# Patient Record
Sex: MALE | Race: WHITE | NOT HISPANIC OR LATINO | Employment: FULL TIME | URBAN - NONMETROPOLITAN AREA
[De-identification: names, ages, dates, MRNs, and addresses within clinical notes are randomized per-mention and may not be internally consistent; named-entity substitution may affect disease eponyms.]

---

## 2018-11-01 ENCOUNTER — HOSPITAL ENCOUNTER (OUTPATIENT)
Dept: GENERAL RADIOLOGY | Facility: HOSPITAL | Age: 28
Discharge: HOME | End: 2018-11-01
Admitting: NURSE PRACTITIONER

## 2018-11-01 ENCOUNTER — TRANSCRIBE ORDERS (OUTPATIENT)
Dept: LAB | Facility: HOSPITAL | Age: 28
End: 2018-11-01

## 2018-11-01 DIAGNOSIS — S62.91XA UNSPECIFIED FRACTURE OF RIGHT WRIST AND HAND, INITIAL ENCOUNTER FOR CLOSED FRACTURE: ICD-10-CM

## 2018-11-01 DIAGNOSIS — S62.91XA UNSPECIFIED FRACTURE OF RIGHT WRIST AND HAND, INITIAL ENCOUNTER FOR CLOSED FRACTURE: Primary | ICD-10-CM

## 2018-11-01 PROCEDURE — 73130 X-RAY EXAM OF HAND: CPT | Performed by: RADIOLOGY

## 2018-11-01 PROCEDURE — 73130 X-RAY EXAM OF HAND: CPT

## 2018-11-15 ENCOUNTER — OFFICE VISIT (OUTPATIENT)
Dept: UROLOGY | Facility: CLINIC | Age: 28
End: 2018-11-15

## 2018-11-15 VITALS — HEIGHT: 70 IN | BODY MASS INDEX: 32.21 KG/M2 | WEIGHT: 225 LBS

## 2018-11-15 DIAGNOSIS — Z30.09 ENCOUNTER FOR VASECTOMY COUNSELING: Primary | ICD-10-CM

## 2018-11-15 PROCEDURE — 99202 OFFICE O/P NEW SF 15 MIN: CPT | Performed by: UROLOGY

## 2018-11-15 RX ORDER — ALPRAZOLAM 1 MG/1
TABLET ORAL
Qty: 1 TABLET | Refills: 0 | Status: SHIPPED | OUTPATIENT
Start: 2018-11-15

## 2018-11-15 RX ORDER — BUPROPION HYDROCHLORIDE 300 MG/1
300 TABLET ORAL DAILY
COMMUNITY

## 2018-11-15 NOTE — PROGRESS NOTES
Chief Complaint:          Vasectomy consult    HPI:   28 y.o. male.    HPI Patient presents to the clinic today requesting a vasectomy for permanent sterilization.  He has 2 children and 1 stepchild.       Past Medical History:      No past medical history on file.      Current Meds:     Current Outpatient Medications   Medication Sig Dispense Refill   • buPROPion XL (WELLBUTRIN XL) 300 MG 24 hr tablet Take 300 mg by mouth Daily.       No current facility-administered medications for this visit.         Allergies:      No Known Allergies     Past Surgical History:     No past surgical history on file.      Social History:     Social History     Socioeconomic History   • Marital status:      Spouse name: Not on file   • Number of children: Not on file   • Years of education: Not on file   • Highest education level: Not on file   Social Needs   • Financial resource strain: Not on file   • Food insecurity - worry: Not on file   • Food insecurity - inability: Not on file   • Transportation needs - medical: Not on file   • Transportation needs - non-medical: Not on file   Occupational History   • Not on file   Tobacco Use   • Smoking status: Never Smoker   • Smokeless tobacco: Never Used   Substance and Sexual Activity   • Alcohol use: No     Frequency: Never   • Drug use: No   • Sexual activity: Yes   Other Topics Concern   • Not on file   Social History Narrative   • Not on file       Family History:     No family history on file.    Review of Systems:     Review of Systems   Genitourinary: Negative for decreased urine volume, difficulty urinating, discharge, dysuria, enuresis, flank pain, frequency, genital sores, hematuria, penile pain, penile swelling, scrotal swelling, testicular pain and urgency.       IPSS Questionnaire (AUA-7):  Over the past month…    1)  Incomplete Emptying  How often have you had a sensation of not emptying your bladder?  0 - Not at all   2)  Frequency  How often have you had to  urinate less than every two hours? 0 - Not at all   3)  Intermittency  How often have you found you stopped and started again several times when you urinated?  0 - Not at all   4) Urgency  How often have you found it difficult to postpone urination?  0 - Not at all   5) Weak Stream  How often have you had a weak urinary stream?  0 - Not at all   6) Straining  How often have you had to push or strain to begin urination?  0 - Not at all   7) Nocturia  How many times did you typically get up at night to urinate?  0 - None   Total Score:  0       Quality of life due to urinary symptoms:  If you were to spend the rest of your life with your urinary condition the way it is now, how would you feel about that? 0-Delighted   Urine Leakage (Incontinence) 0-No Leakage       I have reviewed the follow portions of the patient's history and confirmed they are accurate today:  allergies, current medications, past family history, past medical history, past social history, past surgical history, problem list and ROS  Physical Exam:     Physical Exam   Constitutional: He is oriented to person, place, and time.   HENT:   Head: Normocephalic and atraumatic.   Right Ear: External ear normal.   Left Ear: External ear normal.   Nose: Nose normal.   Mouth/Throat: Oropharynx is clear and moist.   Eyes: Conjunctivae and EOM are normal. Pupils are equal, round, and reactive to light.   Neck: Normal range of motion. Neck supple. No thyromegaly present.   Cardiovascular: Normal rate, regular rhythm, normal heart sounds and intact distal pulses.   No murmur heard.  Pulmonary/Chest: Effort normal and breath sounds normal. No respiratory distress. He has no wheezes. He has no rales. He exhibits no tenderness.   Abdominal: Soft. Bowel sounds are normal. He exhibits no distension and no mass. There is no tenderness. No hernia.   Genitourinary: Penis normal.   Musculoskeletal: Normal range of motion. He exhibits no edema or tenderness.  "  Lymphadenopathy:     He has no cervical adenopathy.   Neurological: He is alert and oriented to person, place, and time. No cranial nerve deficit. He exhibits normal muscle tone. Coordination normal.   Skin: Skin is warm. No rash noted.   Psychiatric: He has a normal mood and affect. His behavior is normal. Judgment and thought content normal.   Nursing note and vitals reviewed.      Ht 177.8 cm (70\")   Wt 102 kg (225 lb)   BMI 32.28 kg/m²    Procedure:         Assessment:     Encounter Diagnosis   Name Primary?   • Encounter for vasectomy counseling Yes     No orders of the defined types were placed in this encounter.      Plan:   I discussed the risks and benefits of vasectomy.  Recanalization, bleeding or infection and expected pain upto 6 weeks and a small risk of chronic testicular pain.    Patient's Body mass index is 32.28 kg/m². BMI is within normal parameters. No follow-up required.      Counseling was given to patient and family for the following topics risks and benefits of treatment options including: surgical complications. The interim medical history and current results were reviewed.  A treatment plan with follow-up was made for Encounter for vasectomy counseling [Z30.09].  This document has been electronically signed by Isaac Tavares MD November 15, 2018 1:12 PM  "

## 2018-12-13 ENCOUNTER — TELEPHONE (OUTPATIENT)
Dept: UROLOGY | Facility: CLINIC | Age: 28
End: 2018-12-13

## 2018-12-13 NOTE — TELEPHONE ENCOUNTER
Patient called back, confirmed amount he would need to pay on day of procedure. Patient voiced understanding.

## 2018-12-13 NOTE — TELEPHONE ENCOUNTER
Patients insurance does not require an authorization. #[id number]. Called patient and left him an message to call me back regarding how much he will need to pay on the day of his procedure. Patient will need to pay $157.72.

## 2018-12-20 ENCOUNTER — PROCEDURE VISIT (OUTPATIENT)
Dept: UROLOGY | Facility: CLINIC | Age: 28
End: 2018-12-20

## 2018-12-20 VITALS — HEIGHT: 70 IN | BODY MASS INDEX: 32.21 KG/M2 | WEIGHT: 225 LBS

## 2018-12-20 DIAGNOSIS — Z30.2 ENCOUNTER FOR VASECTOMY: Primary | ICD-10-CM

## 2018-12-20 PROCEDURE — 55250 REMOVAL OF SPERM DUCT(S): CPT | Performed by: UROLOGY

## 2018-12-20 RX ORDER — HYDROCODONE BITARTRATE AND ACETAMINOPHEN 5; 325 MG/1; MG/1
TABLET ORAL
Qty: 20 TABLET | Refills: 0 | Status: SHIPPED | OUTPATIENT
Start: 2018-12-20

## 2018-12-20 RX ORDER — CEPHALEXIN 250 MG/1
250 CAPSULE ORAL 3 TIMES DAILY
Qty: 21 CAPSULE | Refills: 0 | Status: SHIPPED | OUTPATIENT
Start: 2018-12-20

## 2018-12-20 NOTE — PATIENT INSTRUCTIONS
Vasectomy, Care After  Refer to this sheet in the next few weeks. These instructions provide you with information on caring for yourself after your procedure. Your health care provider may also give you more specific instructions. Your treatment has been planned according to current medical practices, but problems sometimes occur. Call your health care provider if you have any problems or questions after your procedure.  What can I expect after the procedure?  After your procedure, it is typical to have the following:  · Slight swelling or redness or both at the surgical site.  · Mild pain or discomfort in the scrotum.  · Some oozing of blood from the cuts (incisions) made by the surgeon is normal during the first day or two after the procedure.  · Blood in the ejaculate is common and typically clears after a few days.    Follow these instructions at home:  · Only take over-the-counter or prescription medicines for pain, discomfort, or fever as directed by your health care provider.  · Avoid using nonsteroidal anti-inflammatory drugs (NSAIDs) because these can make bleeding worse.  · Apply ice to the injured area:  ? Put ice in a plastic bag.  ? Place a towel between your skin and the bag.  ? Leave the ice on for 20 minutes, 2-3 times a day.  · Avoid being active for the first 2 days after surgery.  · Wear a supporter while moving around for the first week after surgery. You may add some sterile fluffed bandages or a clean washcloth to the scrotal support if the scrotal support irritates your skin.  · Do not participate in sports or perform heavy physical labor for at least 2 weeks.  · You may have protected intercourse 7-10 days after your procedure. Remember, you are not sterile until follow-up specimens show no sperm in your ejaculate.  · Be sure to follow up with your surgeon as instructed to confirm sterility. It usually requires multiple ejaculations to clear the sperm located beyond the vasectomy site of  blockage. You will need at least two specimens showing an absence of sperm before you can resume unprotected intercourse.  Contact a health care provider if:  · You have redness, swelling, or increasing pain in the wounds or testicles (scrotum).  · You see pus coming from the wound.  · You have a fever.  · You notice a foul smell coming from the wound or dressing.  · You notice a breaking open of the stitches (suture) line or wound edges even after sutures have been removed.  · You have increased bleeding from the wounds.  Get help right away if:  · You develop a rash.  · You have difficulty breathing.  · You have any reaction or side effects to medicines given.  This information is not intended to replace advice given to you by your health care provider. Make sure you discuss any questions you have with your health care provider.  Document Released: 07/07/2006 Document Revised: 05/25/2017 Document Reviewed: 07/07/2014  [employer] Interactive Patient Education © 2018 [employer]

## 2018-12-20 NOTE — PROGRESS NOTES
Chief Complaint:          The risks of vasectomy are like any surgery.  There is a low risk of infection.  There is a low risk of bleeding requiring a second operation.  There is a risk of recanalization of the vas which could lead to another child.  This risk of recanalization is about 1 and 1000.  The vasectomy normally causes pain for 2-6 weeks but about 1000 patients without vasectomy will have a chronic pain syndrome for years after vasectomy.    HPI:   28 y.o. male.    HPI      Past Medical History:      History reviewed. No pertinent past medical history.      Current Meds:     Current Outpatient Medications   Medication Sig Dispense Refill   • ALPRAZolam (XANAX) 1 MG tablet Bring to office for procedure. Staff will instruct on dose and timing. 1 tablet 0   • buPROPion XL (WELLBUTRIN XL) 300 MG 24 hr tablet Take 300 mg by mouth Daily.     • cephalexin (KEFLEX) 250 MG capsule Take 1 capsule by mouth 3 (Three) Times a Day. 21 capsule 0   • HYDROcodone-acetaminophen (NORCO) 5-325 MG per tablet 1 to 2 Tablets Every 6 Hours as Needed for PAIN 20 tablet 0     No current facility-administered medications for this visit.         Allergies:      No Known Allergies     Past Surgical History:     History reviewed. No pertinent surgical history.      Social History:     Social History     Socioeconomic History   • Marital status:      Spouse name: Not on file   • Number of children: Not on file   • Years of education: Not on file   • Highest education level: Not on file   Social Needs   • Financial resource strain: Not on file   • Food insecurity - worry: Not on file   • Food insecurity - inability: Not on file   • Transportation needs - medical: Not on file   • Transportation needs - non-medical: Not on file   Occupational History   • Not on file   Tobacco Use   • Smoking status: Never Smoker   • Smokeless tobacco: Never Used   Substance and Sexual Activity   • Alcohol use: No     Frequency: Never   • Drug use:  "No   • Sexual activity: Yes   Other Topics Concern   • Not on file   Social History Narrative   • Not on file       Family History:     Family History   Problem Relation Age of Onset   • No Known Problems Father    • No Known Problems Mother        Review of Systems:     Review of Systems   Constitutional: Negative for chills, fatigue and fever.   HENT: Negative for congestion and sinus pressure.    Respiratory: Negative for shortness of breath.    Cardiovascular: Negative for chest pain.   Gastrointestinal: Negative for abdominal pain, constipation, diarrhea, nausea and vomiting.   Genitourinary: Negative for frequency and urgency.   Musculoskeletal: Negative for back pain and neck pain.   Neurological: Negative for dizziness and headaches.   Hematological: Does not bruise/bleed easily.   Psychiatric/Behavioral: The patient is not nervous/anxious.                Physical Exam:     Physical Exam    Ht 177.8 cm (70\")   Wt 102 kg (225 lb)   BMI 32.28 kg/m²    Procedure:     The procedure's risks: bleeding, infection, chronic testicular pain, and recanalization of the vas were discussed in detail; benefits, and alternatives were discussed with patient.  Written consent was obtained prior to the procedure.  30 minutes prior to the procedure, the patient was pre-medicated with Xanax 1mg.  His scrotum was prepped with Betadine and sterilely draped.  Anesthesia was a mixture of Lidocaine and Marcaine without Epinephrine.  After the patient was prepped and draped in the supine position, a penile scrotal incision in the midline allowed the vas clamp to grab the left vas and bring it up to the wound.  The sharpened hemostat of the [employer] Technique was used to dissect out the vas on the left side.  Clips were applied to the proximal and distal portion of the vas. The left vas specimen had a clip placed for identification. The right vas segment did not have a clip for identification.  3-0 Chromic Suture was used to close the " wound.      Assessment:     Encounter Diagnosis   Name Primary?   • Encounter for vasectomy Yes     No orders of the defined types were placed in this encounter.      Plan:   Return was sperm specimen in 2 months  This document has been electronically signed by Isaac Tavares MD December 20, 2018 2:35 PM

## 2023-11-03 ENCOUNTER — OFFICE VISIT (OUTPATIENT)
Dept: UROLOGY | Facility: CLINIC | Age: 33
End: 2023-11-03
Payer: COMMERCIAL

## 2023-11-03 VITALS
HEIGHT: 70 IN | WEIGHT: 255 LBS | BODY MASS INDEX: 36.51 KG/M2 | HEART RATE: 75 BPM | DIASTOLIC BLOOD PRESSURE: 90 MMHG | SYSTOLIC BLOOD PRESSURE: 149 MMHG

## 2023-11-03 DIAGNOSIS — N50.811 RIGHT TESTICULAR PAIN: Primary | ICD-10-CM

## 2023-11-03 NOTE — PROGRESS NOTES
"Chief Complaint:    Chief Complaint   Patient presents with    Disorder of male genital organs       Vital Signs:   /90   Pulse 75   Ht 177.8 cm (70\")   Wt 116 kg (255 lb)   BMI 36.59 kg/m²   Body mass index is 36.59 kg/m².      HPI:  Tato Price is a 33 y.o. male who presents today for initial evaluation     History of Present Illness  Mr. Price presents to the clinic for evaluation of right testicular pain and possible nodule.  He has been referred to us by his primary care provider NANCY Red.  He has a past medical history significant for anxiety and underwent a elective vasectomy by Dr. Tavares in 2018.  He denies any problems until roughly 2 to 3 weeks ago.  He states he noticed a small nodule superior to the right testicle.  He reports minimal pain with the nodule.  He denies any testicular growth, hematospermia, scrotal swelling, hematuria, fever, chills, painful ejaculation, or difficulty urinating.      Past Medical History:  History reviewed. No pertinent past medical history.    Current Meds:  Current Outpatient Medications   Medication Sig Dispense Refill    ALPRAZolam (XANAX) 1 MG tablet Bring to office for procedure. Staff will instruct on dose and timing. (Patient not taking: Reported on 11/3/2023) 1 tablet 0    buPROPion XL (WELLBUTRIN XL) 300 MG 24 hr tablet Take 300 mg by mouth Daily. (Patient not taking: Reported on 11/3/2023)      cephalexin (KEFLEX) 250 MG capsule Take 1 capsule by mouth 3 (Three) Times a Day. (Patient not taking: Reported on 11/3/2023) 21 capsule 0    HYDROcodone-acetaminophen (NORCO) 5-325 MG per tablet 1 to 2 Tablets Every 6 Hours as Needed for PAIN (Patient not taking: Reported on 11/3/2023) 20 tablet 0     No current facility-administered medications for this visit.        Allergies:   No Known Allergies     Past Surgical History:  History reviewed. No pertinent surgical history.    Social History:  Social History     Socioeconomic History    " Marital status:    Tobacco Use    Smoking status: Never    Smokeless tobacco: Never   Vaping Use    Vaping Use: Never used   Substance and Sexual Activity    Alcohol use: No    Drug use: No    Sexual activity: Yes       Family History:  Family History   Problem Relation Age of Onset    No Known Problems Father     No Known Problems Mother        Review of Systems:  Review of Systems   Constitutional:  Negative for fatigue, fever and unexpected weight change.   Respiratory:  Negative for chest tightness and shortness of breath.    Cardiovascular:  Negative for chest pain.   Gastrointestinal:  Negative for abdominal pain, constipation, diarrhea, nausea and vomiting.   Genitourinary:  Positive for testicular pain. Negative for difficulty urinating, dysuria, frequency and urgency.   Skin:  Negative for rash.   Psychiatric/Behavioral:  Negative for confusion and suicidal ideas.        Physical Exam:  Physical Exam  Constitutional:       General: He is not in acute distress.     Appearance: Normal appearance.   HENT:      Head: Normocephalic and atraumatic.      Nose: Nose normal.      Mouth/Throat:      Mouth: Mucous membranes are moist.   Eyes:      Conjunctiva/sclera: Conjunctivae normal.   Cardiovascular:      Rate and Rhythm: Normal rate and regular rhythm.      Pulses: Normal pulses.      Heart sounds: Normal heart sounds.   Pulmonary:      Effort: Pulmonary effort is normal.      Breath sounds: Normal breath sounds.   Abdominal:      General: Bowel sounds are normal.      Palpations: Abdomen is soft.   Genitourinary:     Penis: Normal.       Comments: Small round cyst in the epididymis.  No tenderness to palpation.  Musculoskeletal:         General: Normal range of motion.      Cervical back: Normal range of motion.   Skin:     General: Skin is warm.   Neurological:      General: No focal deficit present.      Mental Status: He is alert and oriented to person, place, and time.   Psychiatric:         Mood and  Affect: Mood normal.         Behavior: Behavior normal.         Thought Content: Thought content normal.         Judgment: Judgment normal.             Recent Image (CT and/or KUB):   CT Abdomen and Pelvis: No results found for this or any previous visit.     CT Stone Protocol: No results found for this or any previous visit.     KUB: No results found for this or any previous visit.       Labs:  Brief Urine Lab Results       None          No visits with results within 3 Month(s) from this visit.   Latest known visit with results is:   No results found for any previous visit.        Procedure: None  Procedures     I have reviewed and agree with the above PMH, PSH, FMH, social history, medications, allergies, and labs.     Assessment/Plan:   Problem List Items Addressed This Visit    None  Visit Diagnoses       Right testicular pain    -  Primary    Relevant Orders    US Scrotum & Testicles            Health Maintenance:   Health Maintenance Due   Topic Date Due    BMI FOLLOWUP  Never done    COVID-19 Vaccine (1) Never done    TDAP/TD VACCINES (2 - Td or Tdap) 08/13/2017    HEPATITIS C SCREENING  Never done    ANNUAL PHYSICAL  Never done    INFLUENZA VACCINE  Never done        Smoking Counseling: Never smoked or use smokeless tobacco.    Urine Incontinence: Patient reports that he is not currently experiencing any symptoms of urinary incontinence.    Patient was given instructions and counseling regarding his condition or for health maintenance advice. Please see specific information pulled into the AVS if appropriate.    Patient Education:   Right testicular pain/epididymal cyst -discussed with the patient the pathophysiology of testicular pain sensation renal cyst.  I discussed other causes of testicular pain which can include but not limited to advise patient his testicular carcinoma, nephrolithiasis, epididymitis, orchitis, sexually transmitted infections, or other urological abnormalities.  I advised patient that  he most likely has a scrotal cyst/nodule from prior vasectomy.  Given patient is having minimal complaints at this time recommend observation.  We will complete a scrotal ultrasound to rule out any abnormalities of the testicles.  Advised him to use warm soaks and compresses as needed twice daily.  Advised him use good scrotal support and elevation.  We will see him back pending this.    Visit Diagnoses:    ICD-10-CM ICD-9-CM   1. Right testicular pain  N50.811 608.9       Meds Ordered During Visit:  No orders of the defined types were placed in this encounter.      Follow Up Appointment: Pending ultrasound of the scrotum  No follow-ups on file.      This document has been electronically signed by Kannan Albright PA-C   November 3, 2023 16:25 EDT    Part of this note may be an electronic transcription/translation of spoken language to printed text using the Dragon Dictation System.

## 2023-11-16 ENCOUNTER — TELEPHONE (OUTPATIENT)
Dept: UROLOGY | Facility: CLINIC | Age: 33
End: 2023-11-16
Payer: COMMERCIAL

## 2023-11-16 ENCOUNTER — HOSPITAL ENCOUNTER (OUTPATIENT)
Dept: ULTRASOUND IMAGING | Facility: HOSPITAL | Age: 33
Discharge: HOME | End: 2023-11-16
Payer: COMMERCIAL

## 2023-11-16 DIAGNOSIS — N50.811 RIGHT TESTICULAR PAIN: ICD-10-CM

## 2023-11-16 PROCEDURE — 76870 US EXAM SCROTUM: CPT

## 2023-11-16 NOTE — TELEPHONE ENCOUNTER
Called patient advised on ultrasound results showed no concerns.  Patient verbalized understanding.

## 2024-11-14 ENCOUNTER — OFFICE VISIT (OUTPATIENT)
Dept: SURGERY | Facility: CLINIC | Age: 34
End: 2024-11-14
Payer: COMMERCIAL

## 2024-11-14 VITALS
BODY MASS INDEX: 34.39 KG/M2 | HEIGHT: 70 IN | DIASTOLIC BLOOD PRESSURE: 64 MMHG | WEIGHT: 240.2 LBS | HEART RATE: 72 BPM | SYSTOLIC BLOOD PRESSURE: 102 MMHG

## 2024-11-14 DIAGNOSIS — D17.24 LIPOMA OF LEFT LOWER EXTREMITY: Primary | ICD-10-CM

## 2024-11-14 RX ORDER — SEMAGLUTIDE 1 MG/.5ML
INJECTION, SOLUTION SUBCUTANEOUS
COMMUNITY
Start: 2024-10-23

## 2024-11-14 NOTE — PROGRESS NOTES
"Subjective   Ramin Mays is a 34 y.o. male places in both legs.    History of Present Illness  Mr. Mays was seen in the office today for 2 symptomatic lipomas.  He states that they have been present for at least 2 years and the one on the posterior leg has gotten significantly bigger and it causes pain from direct pressure when he sits.  No Known Allergies  Current Outpatient Medications   Medication Sig Dispense Refill    Wegovy 1 MG/0.5ML solution auto-injector INJECT 1 MG SUBCUTANEOUSLY EVERY WEEK       No current facility-administered medications for this visit.     History reviewed. No pertinent past medical history.  Past Surgical History:   Procedure Laterality Date    HERNIA REPAIR      NOSE SURGERY         Pertinent Review of Systems:  Respiratory: no shortness of breath  Cardiovascular: no chest pain  Other pertinent:      Objective   /64 (BP Location: Left arm)   Pulse 72   Ht 177.8 cm (70\")   Wt 109 kg (240 lb 3.2 oz)   BMI 34.47 kg/m²   Physical Exam  On examination this is a well-developed well-nourished male in no acute distress  HEENT examination: Within normal limits.  Conjunctiva pink.  Nose and ears appear normal.  Neck: Supple, full range of motion.  No JVD.  Musculoskeletal: Full range of motion all extremities without focal weakness. Normal gait. No digital cyanosis.  Psych: Patient is alert, oriented x3. Mood and affect are appropriate.  Skin: On the left posterior thigh there is a 2.5 cm subcutaneous mass consistent with lipoma as well as the second 1 on the posterior thigh superior to this measuring 1 cm.  On the left anterior thigh there is a 1.5 cm lipoma and on the right lateral thigh there is a 2 cm lipoma.  Procedures     Results/Data:      Assessment & Plan   Symptomatic lipomas of the lower extremity    Patient to return to the office for an office excision of the symptomatic lipomas of the left thigh.         Discussion/Summary    Time spent:     BMI is >= 30 and <35. " (Class 1 Obesity). The following options were offered after discussion;: exercise counseling/recommendations       No future appointments.      This document has been electronically signed by       November 14, 2024 12:45 EST      Please note that portions of this note were completed with a voice recognition program.

## 2024-12-02 ENCOUNTER — PROCEDURE VISIT (OUTPATIENT)
Dept: SURGERY | Facility: CLINIC | Age: 34
End: 2024-12-02
Payer: COMMERCIAL

## 2024-12-02 VITALS
WEIGHT: 239 LBS | HEIGHT: 70 IN | SYSTOLIC BLOOD PRESSURE: 136 MMHG | HEART RATE: 78 BPM | DIASTOLIC BLOOD PRESSURE: 78 MMHG | BODY MASS INDEX: 34.22 KG/M2

## 2024-12-02 DIAGNOSIS — D17.24 LIPOMA OF LEFT LOWER EXTREMITY: Primary | ICD-10-CM

## 2024-12-02 DIAGNOSIS — D17.24 LIPOMA OF LEFT LOWER EXTREMITY: ICD-10-CM

## 2024-12-04 LAB — REF LAB TEST METHOD: NORMAL

## 2024-12-06 ENCOUNTER — TELEPHONE (OUTPATIENT)
Dept: SURGERY | Facility: CLINIC | Age: 34
End: 2024-12-06
Payer: COMMERCIAL

## 2025-08-25 ENCOUNTER — TRANSCRIBE ORDERS (OUTPATIENT)
Dept: ADMINISTRATIVE | Facility: HOSPITAL | Age: 35
End: 2025-08-25
Payer: COMMERCIAL

## 2025-08-25 DIAGNOSIS — K76.0 FATTY LIVER: Primary | ICD-10-CM
